# Patient Record
Sex: FEMALE | Race: WHITE | Employment: STUDENT | URBAN - METROPOLITAN AREA
[De-identification: names, ages, dates, MRNs, and addresses within clinical notes are randomized per-mention and may not be internally consistent; named-entity substitution may affect disease eponyms.]

---

## 2017-02-03 ENCOUNTER — HOSPITAL ENCOUNTER (EMERGENCY)
Age: 22
Discharge: HOME | End: 2017-02-03
Attending: EMERGENCY MEDICINE
Payer: COMMERCIAL

## 2017-02-03 ENCOUNTER — APPOINTMENT (OUTPATIENT)
Dept: GENERAL RADIOLOGY | Age: 22
End: 2017-02-03
Attending: EMERGENCY MEDICINE
Payer: COMMERCIAL

## 2017-02-03 ENCOUNTER — APPOINTMENT (OUTPATIENT)
Dept: ULTRASOUND IMAGING | Age: 22
End: 2017-02-03
Attending: EMERGENCY MEDICINE
Payer: COMMERCIAL

## 2017-02-03 VITALS
BODY MASS INDEX: 21.19 KG/M2 | RESPIRATION RATE: 22 BRPM | DIASTOLIC BLOOD PRESSURE: 58 MMHG | SYSTOLIC BLOOD PRESSURE: 108 MMHG | WEIGHT: 135 LBS | TEMPERATURE: 99.8 F | HEIGHT: 67 IN | OXYGEN SATURATION: 100 % | HEART RATE: 118 BPM

## 2017-02-03 DIAGNOSIS — J06.9 ACUTE URI: ICD-10-CM

## 2017-02-03 DIAGNOSIS — R50.9 FEBRILE ILLNESS, ACUTE: Primary | ICD-10-CM

## 2017-02-03 LAB
ALBUMIN SERPL BCP-MCNC: 3.3 G/DL (ref 3.5–5)
ALBUMIN/GLOB SERPL: 0.8 {RATIO} (ref 1.1–2.2)
ALP SERPL-CCNC: 76 U/L (ref 45–117)
ALT SERPL-CCNC: 25 U/L (ref 12–78)
ANION GAP BLD CALC-SCNC: 13 MMOL/L (ref 5–15)
APPEARANCE UR: CLEAR
AST SERPL W P-5'-P-CCNC: 16 U/L (ref 15–37)
BACTERIA URNS QL MICRO: NEGATIVE /HPF
BASOPHILS # BLD AUTO: 0 K/UL (ref 0–0.1)
BASOPHILS # BLD: 0 % (ref 0–1)
BILIRUB SERPL-MCNC: 0.3 MG/DL (ref 0.2–1)
BILIRUB UR QL: NEGATIVE
BUN SERPL-MCNC: 6 MG/DL (ref 6–20)
BUN/CREAT SERPL: 7 (ref 12–20)
CALCIUM SERPL-MCNC: 8.5 MG/DL (ref 8.5–10.1)
CHLORIDE SERPL-SCNC: 99 MMOL/L (ref 97–108)
CO2 SERPL-SCNC: 25 MMOL/L (ref 21–32)
COLOR UR: NORMAL
CREAT SERPL-MCNC: 0.81 MG/DL (ref 0.55–1.02)
CRP SERPL-MCNC: 10.1 MG/DL
EOSINOPHIL # BLD: 0 K/UL (ref 0–0.4)
EOSINOPHIL NFR BLD: 0 % (ref 0–7)
EPITH CASTS URNS QL MICRO: NORMAL /LPF
ERYTHROCYTE [DISTWIDTH] IN BLOOD BY AUTOMATED COUNT: 12.1 % (ref 11.5–14.5)
FLUAV AG NPH QL IA: NEGATIVE
FLUBV AG NOSE QL IA: NEGATIVE
GLOBULIN SER CALC-MCNC: 4.1 G/DL (ref 2–4)
GLUCOSE BLD STRIP.AUTO-MCNC: 84 MG/DL (ref 65–100)
GLUCOSE SERPL-MCNC: 103 MG/DL (ref 65–100)
GLUCOSE UR STRIP.AUTO-MCNC: NEGATIVE MG/DL
HCG UR QL: NEGATIVE
HCT VFR BLD AUTO: 37.2 % (ref 35–47)
HGB BLD-MCNC: 13 G/DL (ref 11.5–16)
HGB UR QL STRIP: NEGATIVE
HYALINE CASTS URNS QL MICRO: NORMAL /LPF (ref 0–5)
KETONES SERPL QL: NEGATIVE
KETONES UR QL STRIP.AUTO: NEGATIVE MG/DL
LEUKOCYTE ESTERASE UR QL STRIP.AUTO: NEGATIVE
LYMPHOCYTES # BLD AUTO: 14 % (ref 12–49)
LYMPHOCYTES # BLD: 1.6 K/UL (ref 0.8–3.5)
MAGNESIUM SERPL-MCNC: 1.8 MG/DL (ref 1.6–2.4)
MCH RBC QN AUTO: 30 PG (ref 26–34)
MCHC RBC AUTO-ENTMCNC: 34.9 G/DL (ref 30–36.5)
MCV RBC AUTO: 85.9 FL (ref 80–99)
MONOCYTES # BLD: 1.1 K/UL (ref 0–1)
MONOCYTES NFR BLD AUTO: 10 % (ref 5–13)
NEUTS SEG # BLD: 8.7 K/UL (ref 1.8–8)
NEUTS SEG NFR BLD AUTO: 76 % (ref 32–75)
NITRITE UR QL STRIP.AUTO: NEGATIVE
PH UR STRIP: 6 [PH] (ref 5–8)
PHOSPHATE SERPL-MCNC: 3.1 MG/DL (ref 2.6–4.7)
PLATELET # BLD AUTO: 278 K/UL (ref 150–400)
POTASSIUM SERPL-SCNC: 3.2 MMOL/L (ref 3.5–5.1)
PROT SERPL-MCNC: 7.4 G/DL (ref 6.4–8.2)
PROT UR STRIP-MCNC: NEGATIVE MG/DL
RBC # BLD AUTO: 4.33 M/UL (ref 3.8–5.2)
RBC #/AREA URNS HPF: NORMAL /HPF (ref 0–5)
SERVICE CMNT-IMP: NORMAL
SODIUM SERPL-SCNC: 137 MMOL/L (ref 136–145)
SP GR UR REFRACTOMETRY: <1.005 (ref 1–1.03)
UA: UC IF INDICATED,UAUC: NORMAL
UROBILINOGEN UR QL STRIP.AUTO: 0.2 EU/DL (ref 0.2–1)
WBC # BLD AUTO: 11.4 K/UL (ref 3.6–11)
WBC URNS QL MICRO: NORMAL /HPF (ref 0–4)

## 2017-02-03 PROCEDURE — 86140 C-REACTIVE PROTEIN: CPT | Performed by: EMERGENCY MEDICINE

## 2017-02-03 PROCEDURE — 85025 COMPLETE CBC W/AUTO DIFF WBC: CPT | Performed by: EMERGENCY MEDICINE

## 2017-02-03 PROCEDURE — 81025 URINE PREGNANCY TEST: CPT

## 2017-02-03 PROCEDURE — 87804 INFLUENZA ASSAY W/OPTIC: CPT | Performed by: EMERGENCY MEDICINE

## 2017-02-03 PROCEDURE — 83735 ASSAY OF MAGNESIUM: CPT | Performed by: EMERGENCY MEDICINE

## 2017-02-03 PROCEDURE — 80053 COMPREHEN METABOLIC PANEL: CPT | Performed by: EMERGENCY MEDICINE

## 2017-02-03 PROCEDURE — 76700 US EXAM ABDOM COMPLETE: CPT

## 2017-02-03 PROCEDURE — 96361 HYDRATE IV INFUSION ADD-ON: CPT

## 2017-02-03 PROCEDURE — 82962 GLUCOSE BLOOD TEST: CPT

## 2017-02-03 PROCEDURE — 99284 EMERGENCY DEPT VISIT MOD MDM: CPT

## 2017-02-03 PROCEDURE — 84100 ASSAY OF PHOSPHORUS: CPT | Performed by: EMERGENCY MEDICINE

## 2017-02-03 PROCEDURE — 81001 URINALYSIS AUTO W/SCOPE: CPT | Performed by: EMERGENCY MEDICINE

## 2017-02-03 PROCEDURE — 74011250637 HC RX REV CODE- 250/637: Performed by: EMERGENCY MEDICINE

## 2017-02-03 PROCEDURE — 74011250636 HC RX REV CODE- 250/636: Performed by: EMERGENCY MEDICINE

## 2017-02-03 PROCEDURE — 82009 KETONE BODYS QUAL: CPT | Performed by: EMERGENCY MEDICINE

## 2017-02-03 PROCEDURE — 96360 HYDRATION IV INFUSION INIT: CPT

## 2017-02-03 PROCEDURE — 36415 COLL VENOUS BLD VENIPUNCTURE: CPT | Performed by: EMERGENCY MEDICINE

## 2017-02-03 PROCEDURE — 71020 XR CHEST PA LAT: CPT

## 2017-02-03 RX ORDER — IBUPROFEN 800 MG/1
800 TABLET ORAL
Qty: 30 TAB | Refills: 0 | Status: SHIPPED | OUTPATIENT
Start: 2017-02-03

## 2017-02-03 RX ORDER — AMOXICILLIN 875 MG/1
875 TABLET, FILM COATED ORAL 2 TIMES DAILY
Qty: 20 TAB | Refills: 0 | Status: SHIPPED | OUTPATIENT
Start: 2017-02-03 | End: 2017-02-13

## 2017-02-03 RX ORDER — ACETAMINOPHEN 325 MG/1
325 TABLET ORAL
Status: COMPLETED | OUTPATIENT
Start: 2017-02-03 | End: 2017-02-03

## 2017-02-03 RX ORDER — ACETAMINOPHEN 500 MG
1000 TABLET ORAL
Qty: 30 TAB | Refills: 0 | Status: SHIPPED | OUTPATIENT
Start: 2017-02-03

## 2017-02-03 RX ADMIN — SODIUM CHLORIDE 1000 ML: 900 INJECTION, SOLUTION INTRAVENOUS at 01:02

## 2017-02-03 RX ADMIN — ACETAMINOPHEN 325 MG: 325 TABLET ORAL at 01:01

## 2017-02-03 NOTE — DISCHARGE INSTRUCTIONS
In a       We hope that we have addressed all of your medical concerns. The examination and treatment you received in the Emergency Department were for an emergent problem and were not intended as complete care. It is important that you follow up with your healthcare provider(s) for ongoing care. If your symptoms worsen or do not improve as expected, and you are unable to reach your usual health care provider(s), you should return to the Emergency Department. Today's healthcare is undergoing tremendous change, and patient satisfaction surveys are one of the many tools to assess the quality of medical care. You may receive a survey from the [employer] regarding your experience in the Emergency Department. I hope that your experience has been completely positive, particularly the medical care that I provided. As such, please participate in the survey; anything less than excellent does not meet my expectations or intentions. [address] [place] [address] and [address] participate in nationally recognized quality of care measures. If your blood pressure is greater than 120/80, as reported below, we urge that you seek medical care to address the potential of high blood pressure, commonly known as hypertension. Hypertension can be hereditary or can be caused by certain medical conditions, pain, stress, or \"white coat syndrome. \"       Please make an appointment with your health care provider(s) for follow up of your Emergency Department visit. VITALS:   Patient Vitals for the past 8 hrs:   Temp Pulse Resp BP SpO2   02/03/17 0242 99.2 °F (37.3 °C) - - - -   02/03/17 0015 100 °F (37.8 °C) (!) 137 20 148/83 100 %          Thank you for allowing us to provide you with medical care today. We realize that you have many choices for your emergency care needs. Please choose us in the future for any continued health care needs. [facility], [place]    [address] [facility].   Office: [phone]            Recent Results (from the past 24 hour(s))   GLUCOSE, POC    Collection Time: 02/03/17 12:31 AM   Result Value Ref Range    Glucose (POC) 84 65 - 100 mg/dL    Performed by [facility]    Collection Time: 02/03/17 12:39 AM   Result Value Ref Range    Color YELLOW/STRAW      Appearance CLEAR CLEAR      Specific gravity <1.005 1.003 - 1.030    pH (UA) 6.0 5.0 - 8.0      Protein NEGATIVE  NEG mg/dL    Glucose NEGATIVE  NEG mg/dL    Ketone NEGATIVE  NEG mg/dL    Bilirubin NEGATIVE  NEG      Blood NEGATIVE  NEG      Urobilinogen 0.2 0.2 - 1.0 EU/dL    Nitrites NEGATIVE  NEG      Leukocyte Esterase NEGATIVE  NEG      WBC 0-4 0 - 4 /hpf    RBC 0-5 0 - 5 /hpf    Epithelial cells FEW FEW /lpf    Bacteria NEGATIVE  NEG /hpf    UA:UC IF INDICATED CULTURE NOT INDICATED BY UA RESULT CNI      Hyaline cast 0-2 0 - 5 /lpf   C REACTIVE PROTEIN, QT    Collection Time: 02/03/17 12:39 AM   Result Value Ref Range    C-Reactive protein 10.10 (H) <0.60 mg/dL   CBC WITH AUTOMATED DIFF    Collection Time: 02/03/17 12:39 AM   Result Value Ref Range    WBC 11.4 (H) 3.6 - 11.0 K/uL    RBC 4.33 3.80 - 5.20 M/uL    HGB 13.0 11.5 - 16.0 g/dL    HCT 37.2 35.0 - 47.0 %    MCV 85.9 80.0 - 99.0 FL    MCH 30.0 26.0 - 34.0 PG    MCHC 34.9 30.0 - 36.5 g/dL    RDW 12.1 11.5 - 14.5 %    PLATELET 183 766 - 993 K/uL    NEUTROPHILS 76 (H) 32 - 75 %    LYMPHOCYTES 14 12 - 49 %    MONOCYTES 10 5 - 13 %    EOSINOPHILS 0 0 - 7 %    BASOPHILS 0 0 - 1 %    ABS. NEUTROPHILS 8.7 (H) 1.8 - 8.0 K/UL    ABS. LYMPHOCYTES 1.6 0.8 - 3.5 K/UL    ABS. MONOCYTES 1.1 (H) 0.0 - 1.0 K/UL    ABS. EOSINOPHILS 0.0 0.0 - 0.4 K/UL    ABS.  BASOPHILS 0.0 0.0 - 0.1 K/UL   METABOLIC PANEL, COMPREHENSIVE    Collection Time: 02/03/17 12:39 AM   Result Value Ref Range    Sodium 137 136 - 145 mmol/L    Potassium 3.2 (L) 3.5 - 5.1 mmol/L    Chloride 99 97 - 108 mmol/L    CO2 25 21 - 32 mmol/L    Anion gap 13 5 - 15 mmol/L    Glucose 103 (H) 65 - 100 mg/dL    BUN 6 6 - 20 MG/DL    Creatinine 0.81 0.55 - 1.02 MG/DL    BUN/Creatinine ratio 7 (L) 12 - 20      GFR est AA >60 >60 ml/min/1.73m2    GFR est non-AA >60 >60 ml/min/1.73m2    Calcium 8.5 8.5 - 10.1 MG/DL    Bilirubin, total 0.3 0.2 - 1.0 MG/DL    ALT (SGPT) 25 12 - 78 U/L    AST (SGOT) 16 15 - 37 U/L    Alk. phosphatase 76 45 - 117 U/L    Protein, total 7.4 6.4 - 8.2 g/dL    Albumin 3.3 (L) 3.5 - 5.0 g/dL    Globulin 4.1 (H) 2.0 - 4.0 g/dL    A-G Ratio 0.8 (L) 1.1 - 2.2     MAGNESIUM    Collection Time: 02/03/17 12:39 AM   Result Value Ref Range    Magnesium 1.8 1.6 - 2.4 mg/dL   PHOSPHORUS    Collection Time: 02/03/17 12:39 AM   Result Value Ref Range    Phosphorus 3.1 2.6 - 4.7 MG/DL   HCG URINE, QL. - POC    Collection Time: 02/03/17 12:42 AM   Result Value Ref Range    Pregnancy test,urine (POC) NEGATIVE  NEG     ACETONE/KETONE, QL    Collection Time: 02/03/17  1:03 AM   Result Value Ref Range    Acetone/Ketone serum, Ql. NEGATIVE  NEG        INFLUENZA A & B AG (RAPID TEST)    Collection Time: 02/03/17  1:03 AM   Result Value Ref Range    Influenza A Antigen NEGATIVE  NEG      Influenza B Antigen NEGATIVE  NEG         Xr Chest Pa Lat    Result Date: 2/3/2017  PA AND LATERAL CHEST RADIOGRAPHS: 2/3/2017 1:20 AM INDICATION: Fever. Right lower back pain. COMPARISON: None. TECHNIQUE: Frontal and lateral radiographs of the chest. FINDINGS: The lungs are clear. The central airways are patent. The heart size is normal. No pneumothorax or pleural effusion. IMPRESSION: Clear lungs. Us Abd Comp    Result Date: 2/3/2017  ULTRASOUND OF THE ABDOMEN INDICATION: Right flank pain and fever COMPARISON: None. TECHNIQUE: Sonography of the abdomen was performed. FINDINGS: GALLBLADDER: Decompressed. No gallstones, wall thickening, or pericholecystic fluid. COMMON DUCT: 2 mm in diameter. The duct is normal caliber. LIVER: Normal echogenicity.  No focal hepatic mass or intrahepatic biliary dilatation is shown. MAIN PORTAL VEIN: Patent. Appropriate hepatopetal flow. PANCREAS: The visualized portions of the pancreas are normal. RIGHT KIDNEY: 119 mm in length. No hydronephrosis, shadowing calculus, or contour-deforming renal mass. LEFT KIDNEY: 111 mm in length. No hydronephrosis, shadowing calculus, or contour-deforming renal mass. SPLEEN: Normal size and homogenous echogenicity. AORTA: Normal caliber in its visualized portions. INFERIOR VENA CAVA: Normal caliber in its visualized portions. IMPRESSION: Normal abdominal ultrasound. Learning About Fever  What is a fever? A fever is a high body temperature. It's one way your body fights being sick. A fever shows that the body is responding to infection or other illnesses, both minor and severe. A fever is a symptom, not an illness by itself. A fever can be a sign that you are ill, but most fevers are not caused by a serious problem. You may have a fever with a minor illness, such as a cold. But sometimes a very serious infection may cause little or no fever. It is important to look at other symptoms, other conditions you have, and how you feel in general. In children, notice how they act and see what symptoms they complain of. What is a normal body temperature? A normal body temperature is about 98. 6ºF. Some people have a normal temperature that is a little higher or a little lower than this. Your temperature may be a little lower in the morning than it is later in the day. It may go up during hot weather or when you exercise, wear heavy clothes, or take a hot bath. Your temperature may also be different depending on how you take it. A temperature taken in the mouth (oral) or under the arm may be a little lower than your core temperature (rectal). What is a fever temperature? A core temperature of 100.4°F or above is considered a fever. What can cause a fever?   A fever may be caused by:  · Infections. This is the most common cause of a fever. Examples of infections that can cause a fever include the flu, a kidney infection, or pneumonia. · Some medicines. · Severe trauma or injury, such as a heart attack, stroke, heatstroke, or burns. · Other medical conditions, such as arthritis and some cancers. How can you treat a fever at home? · Ask your doctor if you can take an over-the-counter pain medicine, such as acetaminophen (Tylenol), ibuprofen (Advil, Motrin), or naproxen (Aleve). Be safe with medicines. Read and follow all instructions on the label. · To prevent dehydration, drink plenty of fluids. Choose water and other caffeine-free clear liquids until you feel better. If you have kidney, heart, or liver disease and have to limit fluids, talk with your doctor before you increase the amount of fluids you drink. Follow-up care is a key part of your treatment and safety. Be sure to make and go to all appointments, and call your doctor if you are having problems. It's also a good idea to know your test results and keep a list of the medicines you take. Where can you learn more? Go to http://do-edin.info/. Enter J221 in the search box to learn more about \"Learning About Fever. \"  Current as of: May 27, 2016  Content Version: 11.1  © 8993-2667 Healthwise, Incorporated. Care instructions adapted under license by [employer] (which disclaims liability or warranty for this information). If you have questions about a medical condition or this instruction, always ask your healthcare professional. [place] [address] any warranty or liability for your use of this information. Upper Respiratory Infection: After Your Visit to the Emergency Room  Your Care Instructions  You were seen in the emergency room for an upper respiratory infection (URI). This is an infection of the nose, sinuses, or throat. Viruses or bacteria can cause URIs.  Colds, flu, and sinusitis are examples of URIs. These infections are spread by coughs, sneezes, and close contact with people who have a URI. Your doctor may have given you antibiotics to treat the infection if it was caused by bacteria. But antibiotics will not help a viral infection. You can treat most infections with home care. This may include drinking lots of fluids and taking over-the-counter medicine for your symptoms. Even though you have been released from the emergency room, you still need to watch for any problems. The doctor carefully checked you. But sometimes problems can develop later. If you have new symptoms, or if your symptoms do not get better, return to the emergency room or call your doctor right away. A visit to the emergency room is only one step in your treatment. Even if you feel better, you still need to do what your doctor recommends, such as going to all suggested follow-up appointments and taking medicines exactly as directed. This will help you recover and help prevent future problems. How can you care for yourself at home? · To prevent dehydration, drink plenty of fluids, enough so that your urine is light yellow or clear like water. Choose water and other caffeine-free clear liquids until you feel better. If you have kidney, heart, or liver disease and have to limit fluids, talk with your doctor before you increase the amount of fluids you drink. · Take acetaminophen (Tylenol) or ibuprofen (Advil, Motrin) for fever or pain. Read and follow all instructions on the label. · If your doctor prescribed antibiotics, take them as directed. Do not stop taking them just because you feel better. You need to take the full course of antibiotics. · Take cough medicine and a decongestant if your doctor suggests it. · Get plenty of rest.  · Use saline (saltwater) nasal washes to help keep your nasal passages open and wash out mucus and bacteria.  You can buy saline nose drops at a grocery store or drugstore. Or you can make your own at home by adding 1 teaspoon of salt and 1 teaspoon of baking soda to 2 cups of distilled water. If you make your own, fill a bulb syringe with the solution, insert the tip into your nostril, and squeeze gently. Juanjo Gisell your nose. · Use a vaporizer or humidifier to add moisture to the air in your bedroom. Follow the instructions for cleaning it. · Do not smoke or allow others to smoke around you. If you need help quitting, talk to your doctor about stop-smoking programs and medicines. These can increase your chances of quitting for good. When should you call for help? Call 911 if:  · You have severe trouble breathing. Return to the emergency room now if:  · You have a fever with stiff neck or a severe headache. · You have signs of needing more fluids. You have sunken eyes, a dry mouth, and pass only a little dark urine. · You cannot keep down fluids or medicine. Call your doctor today if:  · You have a deep cough and a lot of mucus. · You are too tired to eat or drink. · You have a new symptom, such as a sore throat, an earache, or a rash. Where can you learn more? Go to [employer]  Enter Q633 in the search box to learn more about \"Upper Respiratory Infection: After Your Visit to the Emergency Room. \"   © 2806-8687 Healthwise, Incorporated. Care instructions adapted under license by Romayne Duster (which disclaims liability or warranty for this information). This care instruction is for use with your licensed healthcare professional. If you have questions about a medical condition or this instruction, always ask your healthcare professional. [place] [address] any warranty or liability for your use of this information.   Content Version: 9.3.86164; Last Revised: February 13, 2012

## 2017-02-03 NOTE — LETTER
[address] [place] [address] Dr 
[facility] EMERGENCY DEPT 
[address] [place] [address] [address] [id number] 
[phone] Work/School Note Date: 2/3/2017 To Whom It May concern: 
 
Hector Madison was seen and treated today in the emergency room by the following provider(s): 
Attending Provider: Franny Amos MD. Hector Madison may return to work on 02/05/2017. Sincerely, Franny Amos MD

## 2017-02-03 NOTE — ED PROVIDER NOTES
HPI Comments: The patient is a 70-year-old female with a past medical history significant for type 1 diabetes who presents to the ED with the complaint of a dermatitis of the fever and chills for approximately 5 days, dry cough and congestion, body aches and general malaise. The patient said that she had symptoms of UTI that she described as frequency, burning, and pain for approximately 2 weeks, that she treated herself with increase water intake. She felt better. Then yesterday she began experiencing intermittent episodes of fever, chills, back, and right flank pain. She also admits to a blood sugar instability as her sugar has a running high in the upper 200s and 300s . She usually gets her blood sugar around . She denies any headache, neck stiffness, nausea, vomiting, diarrhea, or constipation, dysuria, or vaginal discharge or bleeding, dizziness, weakness, numbness, sick contact, skin rash, recent travel. Patient is a 24 y.o. female presenting with urinary tract infection. Bladder Infection    The problem occurs every urination. No past medical history on file. No past surgical history on file. No family history on file. Social History     Social History    Marital status: SINGLE     Spouse name: N/A    Number of children: N/A    Years of education: N/A     Occupational History    Not on file. Social History Main Topics    Smoking status: Not on file    Smokeless tobacco: Not on file    Alcohol use Not on file    Drug use: Not on file    Sexual activity: Not on file     Other Topics Concern    Not on file     Social History Narrative    No narrative on file         ALLERGIES: Review of patient's allergies indicates no known allergies. Review of Systems   All other systems reviewed and are negative.       Vitals:    02/03/17 0015   BP: 148/83   Pulse: (!) 137   Resp: 20   Temp: 100 °F (37.8 °C)   SpO2: 100%   Weight: 61.2 kg (135 lb)   Height: 5' 7\" (1.702 m) Physical Exam   Nursing note and vitals reviewed. CONSTITUTIONAL: Well-appearing; well-nourished; in no apparent distress  HEAD: Normocephalic; atraumatic  EYES: PERRL; EOM intact; conjunctiva and sclera are clear bilaterally. ENT: No rhinorrhea; normal pharynx with no tonsillar hypertrophy; mucous membranes pink/moist, no erythema, no exudate. NECK: Supple; non-tender; no cervical lymphadenopathy  CARD: Normal S1, S2; no murmurs, rubs, or gallops. Increased Regular rate and rhythm. RESP: Normal respiratory effort; breath sounds clear and equal bilaterally; no wheezes, rhonchi, or rales. ABD: Normal bowel sounds; non-distended; non-tender; no palpable organomegaly, no masses, no bruits. Back Exam: Normal inspection; no vertebral point tenderness, no CVA tenderness. Normal range of motion. EXT: Normal ROM in all four extremities; non-tender to palpation; no swelling or deformity; distal pulses are normal, no edema. SKIN: Warm; dry; no rash.   NEURO:Alert and oriented x 3, coherent, DAKOTA-XII grossly intact, sensory and motor are non-focal.        MDM  Number of Diagnoses or Management Options  Diagnosis management comments: Assessment: Febrile illness/ flank pain/ intermittent episode of dysuria, in a patient with history of diabetes, rule out sepsis with UTI/pyelonephritis/ dehydration    Plan: labs/ IV fluid/ antipyretic/ chest x-ray/ ultrasound abdomen/ serial exam/ monitor and reevaluate       Amount and/or Complexity of Data Reviewed  Clinical lab tests: ordered and reviewed  Tests in the radiology section of CPT®: ordered and reviewed  Tests in the medicine section of CPT®: reviewed and ordered  Discussion of test results with the performing providers: yes  Decide to obtain previous medical records or to obtain history from someone other than the patient: yes  Obtain history from someone other than the patient: yes  Review and summarize past medical records: yes  Discuss the patient with other providers: yes  Independent visualization of images, tracings, or specimens: yes    Risk of Complications, Morbidity, and/or Mortality  Presenting problems: moderate  Diagnostic procedures: moderate  Management options: moderate      ED Course       Procedures     XRAY INTERPRETATION (ED MD)  Chest Xray  No acute process seen. Normal heart size. No bony abnormalities. No infiltrate. No att. providers found 4:30 AM      Progress Note:   Pt has been reexamined by Antelmo Walsh MD. Pt is feeling much better. Symptoms have improved. All available results have been reviewed with pt and any available family. He patient is resting in bed comfortably, and in no apparent distress. She appears well and acting appropriately. Lab results and x-ray results were discussed. The patient doesn't appear septic. The patient likely has URI or m. feels like illnessPt understands sx, dx, and tx in ED. Care plan has been outlined and questions have been answered. Pt is ready to go home. Will send home on febrile illness/ URI/ PABLO illness instructions. Prescription o. Outpatient referral with PCP as needed. Written by Antelmo Walsh MD,4:31 AM    .   .

## 2017-02-03 NOTE — ED TRIAGE NOTES
Patient had a uti 2 weeks ago, states that she felt that she 'flushed' it out drinking water, but now is having severe lower right back pain. States that she still has some urinary symptoms mostly when she doesn't drink enough water. Is also diabetic. States that she has had high blood sugars lately.

## 2017-11-07 ENCOUNTER — HOSPITAL ENCOUNTER (OUTPATIENT)
Dept: DIABETES SERVICES | Age: 22
Discharge: HOME | End: 2017-11-07